# Patient Record
(demographics unavailable — no encounter records)

---

## 2019-09-20 NOTE — RAD
EXAM: PA, oblique and lateral as well as scaphoid views of the left wrist

 

DATE: 9/20/2019 12:00 AM

 

INDICATION: Left wrist pain after football injury

 

COMPARISON: 6/10/2016

 

FINDINGS:

Buckling of the distal radius consistent with acute buckle fracture and 

can be correlated with patient's symptoms.

 

IMPRESSION:

Acute distal radial buckle fracture.

 

Electronically signed by: Marshall Elder MD (9/20/2019 4:54 PM) [facility]